# Patient Record
Sex: FEMALE | Race: BLACK OR AFRICAN AMERICAN | NOT HISPANIC OR LATINO | Employment: OTHER | URBAN - METROPOLITAN AREA
[De-identification: names, ages, dates, MRNs, and addresses within clinical notes are randomized per-mention and may not be internally consistent; named-entity substitution may affect disease eponyms.]

---

## 2018-08-12 ENCOUNTER — HOSPITAL ENCOUNTER (EMERGENCY)
Facility: HOSPITAL | Age: 83
Discharge: HOME | End: 2018-08-12
Attending: EMERGENCY MEDICINE
Payer: MEDICARE

## 2018-08-12 VITALS
BODY MASS INDEX: 24.27 KG/M2 | DIASTOLIC BLOOD PRESSURE: 72 MMHG | OXYGEN SATURATION: 99 % | SYSTOLIC BLOOD PRESSURE: 138 MMHG | HEART RATE: 63 BPM | TEMPERATURE: 99 F | RESPIRATION RATE: 16 BRPM | WEIGHT: 137 LBS | HEIGHT: 63 IN

## 2018-08-12 DIAGNOSIS — M54.6 ACUTE MIDLINE THORACIC BACK PAIN: Primary | ICD-10-CM

## 2018-08-12 PROCEDURE — 25000003 PHARM REV CODE 250: Performed by: EMERGENCY MEDICINE

## 2018-08-12 PROCEDURE — 99284 EMERGENCY DEPT VISIT MOD MDM: CPT | Mod: 25

## 2018-08-12 RX ORDER — METHIMAZOLE 10 MG/1
5 TABLET ORAL 3 TIMES DAILY
COMMUNITY

## 2018-08-12 RX ORDER — AMLODIPINE BESYLATE 5 MG/1
5 TABLET ORAL DAILY
COMMUNITY

## 2018-08-12 RX ORDER — ACETAMINOPHEN 325 MG/1
650 TABLET ORAL
Status: COMPLETED | OUTPATIENT
Start: 2018-08-12 | End: 2018-08-12

## 2018-08-12 RX ORDER — SIMVASTATIN 40 MG/1
40 TABLET, FILM COATED ORAL NIGHTLY
COMMUNITY

## 2018-08-12 RX ORDER — CARVEDILOL 6.25 MG/1
6.25 TABLET ORAL 2 TIMES DAILY WITH MEALS
COMMUNITY

## 2018-08-12 RX ORDER — HYDROCHLOROTHIAZIDE 12.5 MG/1
25 CAPSULE ORAL DAILY
COMMUNITY

## 2018-08-12 RX ORDER — GABAPENTIN 100 MG/1
100 CAPSULE ORAL 3 TIMES DAILY
COMMUNITY

## 2018-08-12 RX ORDER — LISINOPRIL 10 MG/1
10 TABLET ORAL DAILY
COMMUNITY

## 2018-08-12 RX ADMIN — ACETAMINOPHEN 650 MG: 325 TABLET ORAL at 09:08

## 2018-08-12 NOTE — ED PROVIDER NOTES
Encounter Date: 8/12/2018       History     Chief Complaint   Patient presents with    Back Pain     pt presents to ed per  ems with c/o back pain while eating at [facility]. pt was reportedly sitting down when another customer got dizzy and fell in her lap. pt reports is now having worsening pain to known bulging disk     The patient presents the emergency department with pain to her neck and upper back.  The patient states she was eating at [employer] and another customer had a seizure and fell on top of her.  The patient's body through her forward toward the table, she did not hit her head and had no loss of consciousness.  The patient is complaining of pain to her neck and her thoracic spine.  The family is concerned because the patient has a history of osteoporosis.          Review of patient's allergies indicates:   Allergen Reactions    Pcn [penicillins]      Past Medical History:   Diagnosis Date    Hypertension     Thyroid disease      History reviewed. No pertinent surgical history.  History reviewed. No pertinent family history.  Social History     Tobacco Use    Smoking status: Never Smoker   Substance Use Topics    Alcohol use: No     Frequency: Never    Drug use: Not on file     Review of Systems   Constitutional: Negative for fever.   HENT: Negative for sore throat.    Respiratory: Negative for shortness of breath.    Cardiovascular: Negative for chest pain.   Gastrointestinal: Negative for nausea.   Genitourinary: Negative for dysuria.   Musculoskeletal: Negative for back pain.   Skin: Negative for rash.   Neurological: Negative for weakness.   Hematological: Does not bruise/bleed easily.       Physical Exam     Initial Vitals [08/12/18 1736]   BP Pulse Resp Temp SpO2   (!) 168/73 61 20 98.7 °F (37.1 °C) 99 %      MAP       --         Physical Exam    Nursing note and vitals reviewed.  Constitutional: She appears well-developed and well-nourished.   HENT:   Head: Normocephalic and  atraumatic.   Mouth/Throat: Oropharynx is clear and moist.   Eyes: Conjunctivae and EOM are normal. Pupils are equal, round, and reactive to light.   Neck: Normal range of motion. Neck supple.   Cardiovascular: Normal rate, regular rhythm, normal heart sounds and intact distal pulses. Exam reveals no gallop and no friction rub.    No murmur heard.  Pulmonary/Chest: Breath sounds normal.   Abdominal: Soft. Bowel sounds are normal. She exhibits no distension. There is no tenderness. There is no rebound and no guarding.   Musculoskeletal: Normal range of motion. She exhibits tenderness (Tenderness to the C5 through C7 region of the cervical spine and the upper thoracic spine area as well as the thoracic paraspinous muscles on the left.  Neck with full range of motion, neck with full range of motion. No ecchymosis, no edema. No crepitance.). She exhibits no edema.   Lymphadenopathy:     She has no cervical adenopathy.   Neurological: She is alert and oriented to person, place, and time. She has normal strength and normal reflexes.   Skin: Skin is warm and dry.   Psychiatric: She has a normal mood and affect. Her behavior is normal. Judgment and thought content normal.         ED Course   Procedures  Labs Reviewed - No data to display       Imaging Results          CT Thoracic Spine Without Contrast (Final result)  Result time 08/12/18 20:00:00    Final result by Daniel Chan MD (08/12/18 20:00:00)                 Impression:      No evidence of acute fracture or listhesis of the thoracic spine.    Multiple anterior bridging osteophytosis.  The findings may be seen with diffuse skeletal hyperostosis.    Small hiatal hernia.    Multinodular goiter.      Electronically signed by: Daniel Chan MD  Date:    08/12/2018  Time:    20:00             Narrative:    EXAMINATION:  CT THORACIC SPINE WITHOUT CONTRAST    CLINICAL HISTORY:  T/L-spine trauma, significant injury suspected, +/- localizing signs;    TECHNIQUE:  Axial CT  scan of the thoracic spine was obtained without intravenous contrast.  Coronal and sagittal reformats were obtained.    COMPARISON:  None    FINDINGS:  The thoracic alignment is within normal limits.  There is diffuse demineralization of the osseous structures.  The vertebral body heights are maintained.  The posterior elements are within normal limits.  There is intervertebral disc space narrowing at multiple levels.  There are multiple anterior bridging osteophytosis.  There is no evidence of spinal canal or neural foraminal narrowing.  There is no evidence of acute fracture or listhesis of the thoracic spine.    There are extensive calcifications along the course of the thoracic aorta.  There is multinodular appearance of the thyroid gland.  There is no evidence of lymphadenopathy in the visualized portions of the mediastinum.  There is a small hiatal hernia.    There are no pleural effusions.  There is no evidence of a pneumothorax.  No airspace opacity is identified.                               CT Cervical Spine Without Contrast (Final result)  Result time 08/12/18 19:41:36    Final result by Mike Figueroa MD (08/12/18 19:41:36)                 Impression:      1. No acute displaced fracture or dislocation of the cervical spine noting degenerative changes above.  2. Marked enlargement of the thyroid, right greater than left, correlation with ultrasound on a nonemergent basis if not previously performed is recommended.  3. Additional findings above.      Electronically signed by: Mike Figueroa MD  Date:    08/12/2018  Time:    19:41             Narrative:    EXAMINATION:  CT CERVICAL SPINE WITHOUT CONTRAST    CLINICAL HISTORY:  C-spine trauma, NEXUS/CCR negative, low risk;    TECHNIQUE:  Low dose axial images, sagittal and coronal reformations were performed though the cervical spine.  Contrast was not administered.    COMPARISON:  None    FINDINGS:  The lung apices are grossly clear noting minimal  biapical atelectasis or scarring.  There is enlargement of the thyroid bilaterally, noting marked enlargement of the right thyroid, measuring 4.3 x 3.9 cm, correlation with ultrasound on a nonemergent basis if not previously performed is recommended.  The parotid glands and remaining salivary glands are grossly unremarkable.  There is vascular calcification within the neck.  No significant cervical lymphadenopathy.    The paraspinous and hypo pharyngeal soft tissues are otherwise grossly unremarkable.  The airway is patent.    Sagittal reformatted imaging demonstrates degenerative changes about the odontoid.  Cervical spinal alignment is preserved without significant vertebral body height loss or disc space height loss.  There is prominent marginal anterior osteophytosis projecting from C4 and C5 with disc osteophyte complex at these levels.  Disc osteophyte complex results in mild spinal canal stenosis to moderate spinal canal stenosis at C3-C4 and C4-C5 and C5-C6.  There is multilevel facet arthropathy resulting in multilevel mild neuroforaminal narrowing.                                 Medical Decision Making:   Clinical Tests:   Radiological Study: Ordered and Reviewed  ED Management:  1851: The patient is here with trauma to her distal cervical and upper thoracic spine.  The patient had a person fall on her while she was sitting in the chair today.  The CT scans are pending at this time                      Clinical Impression:   The encounter diagnosis was Acute midline thoracic back pain.                             Maren Arreola MD  08/12/18 1877